# Patient Record
Sex: MALE | Race: WHITE | NOT HISPANIC OR LATINO | Employment: FULL TIME | URBAN - METROPOLITAN AREA
[De-identification: names, ages, dates, MRNs, and addresses within clinical notes are randomized per-mention and may not be internally consistent; named-entity substitution may affect disease eponyms.]

---

## 2018-05-10 ENCOUNTER — HISTORICAL (OUTPATIENT)
Dept: ADMINISTRATIVE | Facility: HOSPITAL | Age: 18
End: 2018-05-10

## 2018-05-10 LAB
ABS NEUT (OLG): 6.6
ALBUMIN SERPL-MCNC: 5 GM/DL (ref 3.4–5)
ALBUMIN/GLOB SERPL: 2.38 {RATIO} (ref 1.5–2.5)
ALP SERPL-CCNC: 53 UNIT/L (ref 38–126)
ALT SERPL-CCNC: 31 UNIT/L (ref 7–52)
AST SERPL-CCNC: 24 UNIT/L (ref 15–37)
BILIRUB SERPL-MCNC: 0.9 MG/DL (ref 0.2–1)
BILIRUBIN DIRECT+TOT PNL SERPL-MCNC: 0.2 MG/DL (ref 0–0.5)
BILIRUBIN DIRECT+TOT PNL SERPL-MCNC: 0.7 MG/DL
BUN SERPL-MCNC: 12 MG/DL (ref 7–18)
CALCIUM SERPL-MCNC: 9.4 MG/DL (ref 8.5–10)
CHLORIDE SERPL-SCNC: 102 MMOL/L (ref 98–107)
CO2 SERPL-SCNC: 31 MMOL/L (ref 21–32)
CREAT SERPL-MCNC: 0.8 MG/DL (ref 0.6–1.3)
ERYTHROCYTE [DISTWIDTH] IN BLOOD BY AUTOMATED COUNT: 11.7 % (ref 11.5–17)
GLOBULIN SER-MCNC: 2.1 GM/DL (ref 1.2–3)
GLUCOSE SERPL-MCNC: 70 MG/DL (ref 74–106)
HCT VFR BLD AUTO: 44.2 % (ref 42–52)
HGB BLD-MCNC: 15.2 GM/DL (ref 14–18)
LYMPHOCYTES # BLD AUTO: 2.4 X10(3)/MCL (ref 0.6–3.4)
LYMPHOCYTES NFR BLD AUTO: 24.4 % (ref 13–40)
MCH RBC QN AUTO: 30.2 PG (ref 27–31.2)
MCHC RBC AUTO-ENTMCNC: 34 GM/DL (ref 32–36)
MCV RBC AUTO: 88 FL (ref 80–94)
MONOCYTES # BLD AUTO: 0.9 X10(3)/MCL (ref 0–1.8)
MONOCYTES NFR BLD AUTO: 9 % (ref 0.1–24)
NEUTROPHILS NFR BLD AUTO: 66.6 % (ref 47–80)
PLATELET # BLD AUTO: 275 X10(3)/MCL (ref 130–400)
PMV BLD AUTO: 8.3 FL
POTASSIUM SERPL-SCNC: 4 MMOL/L (ref 3.5–5.1)
PROT SERPL-MCNC: 7.1 GM/DL (ref 6.4–8.2)
RBC # BLD AUTO: 5.04 X10(6)/MCL (ref 4.7–6.1)
SODIUM SERPL-SCNC: 139 MMOL/L (ref 136–145)
TSH SERPL-ACNC: 0.67 MIU/ML (ref 0.35–4.94)
WBC # SPEC AUTO: 9.9 X10(3)/MCL (ref 4.5–11.5)

## 2019-04-30 ENCOUNTER — HISTORICAL (OUTPATIENT)
Dept: ADMINISTRATIVE | Facility: HOSPITAL | Age: 19
End: 2019-04-30

## 2019-04-30 LAB
ABS NEUT (OLG): 5.1 X10(3)/MCL (ref 2.1–9.2)
ALBUMIN SERPL-MCNC: 4.8 GM/DL (ref 3.4–5)
ALBUMIN/GLOB SERPL: 1.85 {RATIO} (ref 1.5–2.5)
ALP SERPL-CCNC: 47 UNIT/L (ref 38–126)
ALT SERPL-CCNC: 22 UNIT/L (ref 7–52)
AST SERPL-CCNC: 21 UNIT/L (ref 15–37)
BILIRUB SERPL-MCNC: 0.5 MG/DL (ref 0.2–1)
BILIRUBIN DIRECT+TOT PNL SERPL-MCNC: 0.1 MG/DL (ref 0–0.5)
BILIRUBIN DIRECT+TOT PNL SERPL-MCNC: 0.4 MG/DL
BUN SERPL-MCNC: 16 MG/DL (ref 7–18)
CALCIUM SERPL-MCNC: 9.4 MG/DL (ref 8.5–10)
CHLORIDE SERPL-SCNC: 105 MMOL/L (ref 98–107)
CO2 SERPL-SCNC: 26 MMOL/L (ref 21–32)
CREAT SERPL-MCNC: 0.94 MG/DL (ref 0.6–1.3)
ERYTHROCYTE [DISTWIDTH] IN BLOOD BY AUTOMATED COUNT: 12 % (ref 11.5–17)
GLOBULIN SER-MCNC: 2.6 GM/DL (ref 1.2–3)
GLUCOSE SERPL-MCNC: 150 MG/DL (ref 74–106)
HCT VFR BLD AUTO: 43.1 % (ref 42–52)
HGB BLD-MCNC: 15.3 GM/DL (ref 14–18)
LYMPHOCYTES # BLD AUTO: 2.3 X10(3)/MCL (ref 0.6–3.4)
LYMPHOCYTES NFR BLD AUTO: 28.3 % (ref 13–40)
MCH RBC QN AUTO: 30.6 PG (ref 27–31.2)
MCHC RBC AUTO-ENTMCNC: 36 GM/DL (ref 32–36)
MCV RBC AUTO: 86 FL (ref 80–94)
MONOCYTES # BLD AUTO: 0.6 X10(3)/MCL (ref 0.1–1.3)
MONOCYTES NFR BLD AUTO: 7.9 % (ref 0.1–24)
NEUTROPHILS NFR BLD AUTO: 63.8 % (ref 47–80)
PLATELET # BLD AUTO: 284 X10(3)/MCL (ref 130–400)
PMV BLD AUTO: 8.3 FL (ref 9.4–12.4)
POTASSIUM SERPL-SCNC: 3.6 MMOL/L (ref 3.5–5.1)
PROT SERPL-MCNC: 7.4 GM/DL (ref 6.4–8.2)
RBC # BLD AUTO: 5 X10(6)/MCL (ref 4.7–6.1)
SODIUM SERPL-SCNC: 137 MMOL/L (ref 136–145)
TSH SERPL-ACNC: 1.22 MIU/ML (ref 0.35–4.94)
WBC # SPEC AUTO: 8 X10(3)/MCL (ref 4.5–11.5)

## 2021-01-19 ENCOUNTER — HISTORICAL (OUTPATIENT)
Dept: ADMINISTRATIVE | Facility: HOSPITAL | Age: 21
End: 2021-01-19

## 2021-01-19 LAB
ABS NEUT (OLG): 8.5 X10(3)/MCL (ref 2.1–9.2)
ALBUMIN SERPL-MCNC: 4.9 GM/DL (ref 3.4–5)
ALBUMIN/GLOB SERPL: 2.04 {RATIO} (ref 1.5–2.5)
ALP SERPL-CCNC: 40 UNIT/L (ref 38–126)
ALT SERPL-CCNC: 24 UNIT/L (ref 7–52)
AST SERPL-CCNC: 19 UNIT/L (ref 15–37)
BILIRUB SERPL-MCNC: 0.7 MG/DL (ref 0.2–1)
BILIRUBIN DIRECT+TOT PNL SERPL-MCNC: 0.2 MG/DL (ref 0–0.5)
BILIRUBIN DIRECT+TOT PNL SERPL-MCNC: 0.5 MG/DL
BUN SERPL-MCNC: 13 MG/DL (ref 7–18)
CALCIUM SERPL-MCNC: 10 MG/DL (ref 8.5–10.1)
CHLORIDE SERPL-SCNC: 104 MMOL/L (ref 98–107)
CO2 SERPL-SCNC: 26 MMOL/L (ref 21–32)
CREAT SERPL-MCNC: 0.89 MG/DL (ref 0.6–1.3)
ERYTHROCYTE [DISTWIDTH] IN BLOOD BY AUTOMATED COUNT: 11.8 % (ref 11.5–17)
GLOBULIN SER-MCNC: 2.4 GM/DL (ref 1.2–3)
GLUCOSE SERPL-MCNC: 98 MG/DL (ref 74–106)
HCT VFR BLD AUTO: 45.9 % (ref 42–52)
HGB BLD-MCNC: 15.4 GM/DL (ref 14–18)
LYMPHOCYTES # BLD AUTO: 1.2 X10(3)/MCL (ref 0.6–3.4)
LYMPHOCYTES NFR BLD AUTO: 11.3 % (ref 13–40)
MCH RBC QN AUTO: 28.9 PG (ref 27–31.2)
MCHC RBC AUTO-ENTMCNC: 34 GM/DL (ref 32–36)
MCV RBC AUTO: 86 FL (ref 80–94)
MONOCYTES # BLD AUTO: 0.5 X10(3)/MCL (ref 0.1–1.3)
MONOCYTES NFR BLD AUTO: 4.7 % (ref 0.1–24)
NEUTROPHILS NFR BLD AUTO: 84 % (ref 47–80)
PLATELET # BLD AUTO: 346 X10(3)/MCL (ref 130–400)
PMV BLD AUTO: 8.2 FL (ref 9.4–12.4)
POTASSIUM SERPL-SCNC: 3.9 MMOL/L (ref 3.5–5.1)
PROT SERPL-MCNC: 7.3 GM/DL (ref 6.4–8.2)
RBC # BLD AUTO: 5.33 X10(6)/MCL (ref 4.7–6.1)
SODIUM SERPL-SCNC: 141 MMOL/L (ref 136–145)
WBC # SPEC AUTO: 10.2 X10(3)/MCL (ref 4.5–11.5)

## 2022-04-10 ENCOUNTER — HISTORICAL (OUTPATIENT)
Dept: ADMINISTRATIVE | Facility: HOSPITAL | Age: 22
End: 2022-04-10

## 2022-04-27 VITALS — DIASTOLIC BLOOD PRESSURE: 70 MMHG | SYSTOLIC BLOOD PRESSURE: 122 MMHG | WEIGHT: 127.88 LBS

## 2022-05-03 NOTE — HISTORICAL [facility] CERNER
This is a historical note converted from Aleksandr. Formatting and pictures may have been removed.  Please reference Aleksandr for original formatting and attached multimedia. Chief Complaint  90 l form  History of Present Illness  Here today with parents for 90l form completion as well as blood work today. Overall has been doing well, denies any concerns or questions by parents- both mom and dad present. Past due on many vaccines, however has pediatrician faxing over records so we may review and start a catch up schedule. Mom also requesting TSH today as she has history of thyroid disease. States he is very active, never sits still, very busy.  Review of Systems  Constitutional:?no weight gain,?no weight loss,?no fatigue,?no fever,?no chills,?no weakness,?no trouble sleeping.  Eyes:?no vision loss/changes,?no glasses or contacts,?no pain,?no redness,?no blurry or double vision,?no flashing lights,?no specks,?no glaucoma,?no cataracts.  Last eye exam:?unknown- mom will call Dr. Joseph for exam  Head:?no headache,?no head injury,?no neck pain.?  Neck:??no lumps,?no swollen glands,?no stiffness.  Ears:?no decreased hearing,?no ringing,?no earache,?no drainage.?  Nose:?no stuffiness,?no discharge,?no itching,?no hay fever,?no nosebleeds,?no sinus pain.  Throat:?no bleeding,?no dentures,?no sore tongue,?no dry mouth,?no sore throat,?no hoarseness,?no thrush,?no non-healing sores.  Cardiovascular:?no chest pain or discomfort,?no tightness,?no palpitations,?no SOB with activity,?no difficulty breathing while supine,?no swelling,?no sudden awakening from sleep with SOB.  Vascular:?no calf pain with walking,?no leg cramping.  Respiratory:??no cough,?no sputum,?no coughing up blood,?no SOB,?no wheezing,?no painful breathing.  Gastrointestinal:?no swallowing difficulty,?no heartburn,?no change in appetite,?no nausea,?no change in bowel habits,?no rectal bleeding,?no constipation,?no diarrhea,?no yellow eyes or skin.  Urinary:?no  frequency,?no urgency,?no burning or pain,?no blood in urine,?no incontinence,?no change in urinary strength.  Musculoskeletal:?no muscle or joint pain,?no stiffness,?no back pain,?no redness of joints,?no swelling of joints,?no trauma.  Skin:?no rashes,?no lumps,?no itching,?no dryness,?color normal for ethnicity,?no hair or nail changes.  Neurologic:?no dizziness,?no fainting,?no seizures,?no weakness,?no numbness,?no tingling,?no tremors.  Psychiatric:?no nervousness,?no stress,?no depression,?no memory loss.  Endocrine:?no heat or cold intolerance,?no sweating,?no frequent urination,?no thirst,?no change in appetite.  Hematologic:?no ease of bruising,?no ease of bleeding.  Mom and Dad Deny anything that aware of, non-verbal however has not seemed to have any c/o at present  Physical Exam  Vitals & Measurements  HR:?78(Peripheral)? BP:?112/72?  HT:?175.26?cm? WT:?56.1?kg?  General- In NAD, A&O- non-verbal  ?   Eye- PERRL, EOMI- will inquire about eye exam  ?   HENT- TM/EAC clear, Nose mucosa WNL, No D/C, No Sinus Tenderness, O/P without erythema or exudates?  ?   Neck- S, No LA, No Thyromegaly, No bruits, No JVD  ?   Respiratory- CTA, No wheezing, No crackles, No rhonchi  ?   Cardiovascular- RRR W/O MGR, Pulses equal throughout  ?   Gastrointestinal- S, NT, No HSM, NABS, No masses, No peritoneal signs?  ?   Lymphatics- WNL  ?  Musculoskeletal- No tenderness, Joints WNL, FROM, Neg SLR, No CCE  ?  Integumentary- Warm, dry, intact, No lesions/rashes/hives  ?  Neurologic- No Motor/Sensory deficits- non-verbal autism , Reflexes +2 throughout, CN II-XII intact, Neg cerebellar tests  ?  Assessment/Plan  1.?Wellness examination  1. Exercise as tolerate, monitor diet (TLC)  2. CBC CMP TSH today  3. Forms completed  4. Once records from peds reviewed will schedule nv for catch immunizations  Ordered:  Automated Diff, Routine collect, 05/10/18 14:35:00 CDT, Blood, Collected, Stop date 05/10/18 14:35:00 CDT, Lab Collect,  Wellness examination, 05/10/18 14:35:00 CDT  CBC w/ Auto Diff, Routine collect, 05/10/18 14:35:00 CDT, Blood, Stop date 05/10/18 14:35:00 CDT, Lab Collect, Wellness examination, 05/10/18 14:35:00 CDT  Comprehensive Metabolic Panel, Routine collect, 05/10/18 14:35:00 CDT, Blood, Stop date 05/10/18 14:35:00 CDT, Lab Collect, Wellness examination, 05/10/18 14:35:00 CDT  [facility] New 18-39 years [id number] PC, Wellness examination, [facility], 05/10/18 14:32:00 CDT  Thyroid Stimulating Hormone, Routine collect, 05/10/18 14:35:00 CDT, Blood, Stop date 05/10/18 14:35:00 CDT, Lab Collect, Wellness examination, 05/10/18 14:35:00 CDT  ?  2.?Autism  1. Continue f/u with Dr. Oh as scheduled  ?  Orders:  Clinic Follow-up PRN, 05/10/18 14:32:00 CDT, [facility], Future Order   Problem List/Past Medical History  Ongoing  Autism  Wellness examination  Historical  No qualifying data  Procedure/Surgical History  pe tubes, Tonsillectomy and adenoidectomy.  Medications  Oxcarbazepine 300 Mg/5 Ml Susp  Risperidone 1 Mg/ml Solution  Allergies  sulfa drugs?(Upset stomach)  Social History  Alcohol  Never, 05/10/2018  Employment/School  Student, 05/10/2018  Home/Environment  Lives with Father, Mother. Living situation: Home/Independent., 05/10/2018  Substance Abuse  Never, 05/10/2018  Tobacco  Never smoker Use:., 05/10/2018  Family History  Hypothyroidism.: Mother.

## 2022-05-03 NOTE — HISTORICAL [facility] C[facility]
This is a historical note converted from Aleksandr. Formatting and pictures may have been removed.  Please reference Aleksandr for original formatting and attached multimedia. Chief Complaint  recheck meds  History of Present Illness  he has autism  having some mild fine tremor at times  taking 0.75cc risperdal daily and oxcarbazepine apparently from dr alanna rome would like to remove?his ?wisdom teeth but dr jose bruner rec not removing them  he is here with his parents;  he is nonverbal,  basically no new c/o  ?  Review of Systems  no new complaints  Physical Exam  Vitals & Measurements  HR:?78(Peripheral)? BP:?122/70?  WT:?58.000?kg? WT:?58?kg?  ?  PHYSICAL EXAM  ?   WDWN patient in NAD, ?AFVSS, quiet, follows commands politely; seems well cared for  HEENT - no acute abnormality, has a left PET  ??????????????? oropharynx WNL  HEART - RRR  LUNGS -? CTA  ABDOMEN - benign, NTND;? no peritoneal signs  EXTREMITIES - No CCE  SKIN - warm, dry, intact  PSYCH - affect appropriate;? alert and oriented  NEURO- no new deficits noted;? cranial nerves grossly intact  ?  Assessment/Plan  1.?Wellness examination?Z00.00  ?continue meds  he is doing well  will do some routine labs, monitor sodium /renal function  Ordered:  Clinic Follow up, *Est. 01/25/22 13:30:00 CST, PLEASE MAKE THIS A 30 MINUTE PHYSICAL, Order for future visit, Wellness examination, [facility]  Comprehensive Metabolic Panel, Routine collect, 01/19/21 15:12:00 CST, Blood, Stop date 01/19/21 15:12:00 CST, Lab Collect, Wellness examination  Medication management, 01/19/21 15:12:00 CST  Preventative Health Care Est 18-39 years [id number] PC, Wellness examination, [facility] - [facility], 01/19/21 14:48:00 CST  ?  2.?Autism?F84.0  ?chronic , stable  ?  3.?Influenza vaccine refused?Z28.21  ?  4.?Medication management?Z79.899  Ordered:  Comprehensive Metabolic Panel, Routine collect, 01/19/21 15:12:00 CST, Blood, Stop date 01/19/21 15:12:00 CST, Lab Collect, Wellness  examination  Medication management, 01/19/21 15:12:00 CST  ?  5.?Encounter for vaccination?Z23  ?  6.?Presence of tympanostomy tube in tympanic membrane?Z96.22  ?  Referrals  Clinic Follow up, *Est. 01/25/22 13:30:00 CST, PLEASE MAKE THIS A 30 MINUTE PHYSICAL, Order for future visit, Wellness examination, [facility]   Problem List/Past Medical History  Ongoing  Autism  Influenza vaccine refused  Medication management  Presence of tympanostomy tube in tympanic membrane  Wellness examination  Historical  No qualifying data  Procedure/Surgical History  pe tubes  Tonsillectomy and adenoidectomy   Medications  Oxcarbazepine 300 Mg/5 Ml Susp  Risperidone 1 Mg/ml Solution  Allergies  sulfa drugs?(Upset stomach)  Social History  Abuse/Neglect  No, 01/19/2021  Alcohol  Never, 05/10/2018  Employment/School  Student, 05/10/2018  Home/Environment  Lives with Father, Mother. Living situation: Home/Independent., 05/10/2018  Substance Use  Never, 05/10/2018  Tobacco  Never (less than 100 in lifetime), N/A, 01/19/2021  Family History  Hypothyroidism.: Mother.  Immunizations  Vaccine Date Status Comments   varicella virus vaccine 01/19/2021 Given    measles/mumps/rubella virus vaccine 07/16/2019 Given    varicella virus vaccine 07/16/2019 Given    measles/mumps/rubella virus vaccine 04/30/2019 Given given subq in right arm   tetanus/diphtheria/pertussis, acel(Tdap) 04/30/2019 Given    meningococcal conjugate vaccine 08/17/2015 Recorded    diphtheria-tetanus toxoids (DT) ped 08/20/2003 Recorded    diphtheria-tetanus toxoids (DT) ped 08/17/2000 Recorded    diphtheria/tetanus/pertussis (DTaP) ped 08/20/1999 Recorded    haemophilus b-hepatitis B vaccine 08/20/1999 Recorded    diphtheria/tetanus/pertussis (DTaP) ped 05/19/1999 Recorded    poliovirus vaccine, inactivated 05/19/1999 Recorded    poliovirus vaccine, live, trivalent 1998 Recorded    diphth/haemophilus/pertusis,acel/tetanus 1998 Recorded    hepatitis B pediatric  vaccine 1998 Recorded    hepatitis B pediatric vaccine 1998 Recorded    Health Maintenance  Health Maintenance  ???Pending?(in the next year)  ??? ??OverDue  ??? ? ? ?ADL Screening due??05/10/19??and every 1??year(s)  ??? ? ? ?Influenza Vaccine due??10/01/20??and every 1??day(s)  ??? ??Due?  ??? ? ? ?Alcohol Misuse Screening due??01/02/21??and every 1??year(s)  ??? ? ? ?Body Mass Index Check due??01/19/21??Unknown Frequency  ??? ? ? ?Depression Screening due??01/19/21??Unknown Frequency  ??? ??Due In Future?  ??? ? ? ?Obesity Screening not due until??01/01/22??and every 1??year(s)  ???Satisfied?(in the past 1 year)  ??? ??Satisfied?  ??? ? ? ?Blood Pressure Screening on??01/19/21.??Satisfied by Jannet Del Rosario LPN  ??? ? ? ?Obesity Screening on??01/19/21.??Satisfied by Jannet Del Rosario LPN  ?

## 2022-05-24 DIAGNOSIS — Z76.0 MEDICATION REFILL: Primary | ICD-10-CM

## 2022-05-25 RX ORDER — OXCARBAZEPINE 60 MG/ML
SUSPENSION ORAL
Qty: 250 ML | Refills: 1 | Status: SHIPPED | OUTPATIENT
Start: 2022-05-25 | End: 2023-01-10

## 2023-07-24 DIAGNOSIS — F84.0 AUTISTIC DISORDER: ICD-10-CM

## 2023-07-24 DIAGNOSIS — Z76.0 MEDICATION REFILL: ICD-10-CM

## 2023-07-24 RX ORDER — OXCARBAZEPINE 60 MG/ML
SUSPENSION ORAL
Qty: 250 ML | Refills: 1 | Status: SHIPPED | OUTPATIENT
Start: 2023-07-24 | End: 2023-12-28

## 2023-07-24 RX ORDER — RISPERIDONE 1 MG/ML
SOLUTION ORAL
Qty: 22 ML | Refills: 3 | Status: SHIPPED | OUTPATIENT
Start: 2023-07-24 | End: 2023-10-02

## 2023-10-02 DIAGNOSIS — F84.0 AUTISTIC DISORDER: ICD-10-CM

## 2023-10-02 RX ORDER — RISPERIDONE 1 MG/ML
SOLUTION ORAL
Qty: 22 ML | Refills: 3 | Status: SHIPPED | OUTPATIENT
Start: 2023-10-02 | End: 2023-12-28

## 2023-12-28 DIAGNOSIS — Z76.0 MEDICATION REFILL: ICD-10-CM

## 2023-12-28 DIAGNOSIS — F84.0 AUTISTIC DISORDER: ICD-10-CM

## 2023-12-28 RX ORDER — RISPERIDONE 1 MG/ML
SOLUTION ORAL
Qty: 22 ML | Refills: 0 | Status: SHIPPED | OUTPATIENT
Start: 2023-12-28 | End: 2024-02-26

## 2023-12-28 RX ORDER — OXCARBAZEPINE 60 MG/ML
SUSPENSION ORAL
Qty: 250 ML | Refills: 0 | Status: SHIPPED | OUTPATIENT
Start: 2023-12-28 | End: 2024-04-02 | Stop reason: SDUPTHER

## 2024-02-26 DIAGNOSIS — F84.0 AUTISTIC DISORDER: ICD-10-CM

## 2024-02-26 RX ORDER — RISPERIDONE 1 MG/ML
SOLUTION ORAL
Qty: 22 ML | Refills: 0 | Status: SHIPPED | OUTPATIENT
Start: 2024-02-26 | End: 2024-04-02 | Stop reason: SDUPTHER

## 2024-04-11 PROCEDURE — 80185 ASSAY OF PHENYTOIN TOTAL: CPT | Performed by: FAMILY MEDICINE
